# Patient Record
Sex: MALE | Race: AMERICAN INDIAN OR ALASKA NATIVE
[De-identification: names, ages, dates, MRNs, and addresses within clinical notes are randomized per-mention and may not be internally consistent; named-entity substitution may affect disease eponyms.]

---

## 2019-12-10 ENCOUNTER — HOSPITAL ENCOUNTER (EMERGENCY)
Dept: HOSPITAL 5 - ED | Age: 59
Discharge: LEFT BEFORE DISCHARGE | End: 2019-12-10
Payer: SELF-PAY

## 2019-12-10 VITALS — SYSTOLIC BLOOD PRESSURE: 130 MMHG | DIASTOLIC BLOOD PRESSURE: 79 MMHG

## 2019-12-10 DIAGNOSIS — F41.9: ICD-10-CM

## 2019-12-10 DIAGNOSIS — M25.562: Primary | ICD-10-CM
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
## 2019-12-10 PROCEDURE — 99281 EMR DPT VST MAYX REQ PHY/QHP: CPT

## 2019-12-10 NOTE — EMERGENCY DEPARTMENT REPORT
Chief Complaint: Anxiety


Stated Complaint: ANXIETY/KNEE INFLAMMATION


Time Seen by Provider: 12/10/19 13:56





- HPI


History of Present Illness: 





59 y o male PMH of anxiety presents with cc of left knee pain s/p trip but no 

fall incident yesterday.  She states she tripped over something yesterday but 

did not fall.  Patient states his dentist and having some left knee pain since 

the incident.


He denies trauma, injuries or fall, 





- ROS


Review of Systems: 





as noted in HPI





- Exam


Vital Signs: 


                                   Vital Signs











  12/10/19





  13:51


 


Temperature 98.3 F


 


Pulse Rate 73


 


Respiratory 16





Rate 


 


Blood Pressure 130/79


 


O2 Sat by Pulse 99





Oximetry 











Physical Exam: 


GEN:aao x 3, ambulatory


EXT: non tender, no swelling





MSE screening note: 


Focused history and physical exam performed.


Due to findings the following was ordered:











ED Medical Decision Making





- Medical Decision Making





59-year-old male presents here for knee pain.


Discussed the patient this is not a medical emergency and is to follow-up with 

the primary care physician.


Vital signs are normal patient is in no acute distress.


Discussed follow-up with therapist to manage his anxiety.





ED Disposition for MSE


Clinical Impression: 


 Knee pain, Anxiety





Disposition: Z-07 MED SCREENING EXAM-LEFT


Is pt being admited?: No


Does the pt Need Aspirin: No


Condition: Stable


Instructions:  Arthralgia (ED)


Additional Instructions: 


follow up with Dr baldwin


Referrals: 


[facility] [Outside] - 3-5 Days


[facility] [Outside] - 3-5 Days


Forms:  Work/School Release Form(ED)


Time of Disposition: 13:57